# Patient Record
Sex: MALE | Race: WHITE | Employment: STUDENT | URBAN - NONMETROPOLITAN AREA
[De-identification: names, ages, dates, MRNs, and addresses within clinical notes are randomized per-mention and may not be internally consistent; named-entity substitution may affect disease eponyms.]

---
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
## 2021-07-30 ENCOUNTER — TELEPHONE (OUTPATIENT)
Dept: CARDIOLOGY CLINIC | Age: 21
End: 2021-07-30

## 2021-09-16 ENCOUNTER — HOSPITAL ENCOUNTER (EMERGENCY)
Age: 21
Discharge: LEFT BEFORE DISCHARGE | End: 2021-09-17
Payer: COMMERCIAL

## 2021-09-16 VITALS
TEMPERATURE: 98 F | BODY MASS INDEX: 28.12 KG/M2 | WEIGHT: 175 LBS | RESPIRATION RATE: 16 BRPM | HEIGHT: 66 IN | OXYGEN SATURATION: 96 % | HEART RATE: 104 BPM

## 2025-05-05 ENCOUNTER — OFFICE VISIT (OUTPATIENT)
Dept: OTOLARYNGOLOGY | Facility: CLINIC | Age: 25
End: 2025-05-05
Payer: COMMERCIAL

## 2025-05-05 VITALS
SYSTOLIC BLOOD PRESSURE: 135 MMHG | TEMPERATURE: 98 F | BODY MASS INDEX: 34.55 KG/M2 | HEART RATE: 65 BPM | DIASTOLIC BLOOD PRESSURE: 85 MMHG | HEIGHT: 66 IN | WEIGHT: 215 LBS

## 2025-05-05 DIAGNOSIS — J34.2 NASAL SEPTAL DEVIATION: ICD-10-CM

## 2025-05-05 DIAGNOSIS — J34.3 NASAL TURBINATE HYPERTROPHY: Primary | ICD-10-CM

## 2025-05-05 RX ORDER — TRAZODONE HYDROCHLORIDE 50 MG/1
50 TABLET ORAL NIGHTLY
COMMUNITY

## 2025-05-05 RX ORDER — FLUTICASONE PROPIONATE 50 MCG
SPRAY, SUSPENSION (ML) NASAL
COMMUNITY

## 2025-05-05 RX ORDER — CHLORHEXIDINE GLUCONATE ORAL RINSE 1.2 MG/ML
SOLUTION DENTAL
COMMUNITY

## 2025-05-05 RX ORDER — ARIPIPRAZOLE 5 MG/1
5 TABLET ORAL DAILY
COMMUNITY

## 2025-05-05 RX ORDER — CARIPRAZINE 3 MG/1
3 CAPSULE, GELATIN COATED ORAL DAILY
COMMUNITY

## 2025-05-05 RX ORDER — PROPRANOLOL HYDROCHLORIDE 40 MG/1
40 TABLET ORAL DAILY
COMMUNITY

## 2025-05-05 RX ORDER — CETIRIZINE HYDROCHLORIDE 10 MG/1
10 TABLET ORAL DAILY
COMMUNITY

## 2025-05-05 RX ORDER — HYDROCHLOROTHIAZIDE 12.5 MG/1
12.5 TABLET ORAL DAILY
COMMUNITY

## 2025-05-05 RX ORDER — BUSPIRONE HYDROCHLORIDE 7.5 MG/1
TABLET ORAL
COMMUNITY

## 2025-05-05 RX ORDER — ALBUTEROL SULFATE 90 UG/1
INHALANT RESPIRATORY (INHALATION)
COMMUNITY

## 2025-05-05 RX ORDER — HYDROXYZINE HYDROCHLORIDE 10 MG/1
TABLET, FILM COATED ORAL
COMMUNITY

## 2025-05-05 RX ORDER — FLUOXETINE 10 MG/1
10 CAPSULE ORAL DAILY
COMMUNITY

## 2025-05-05 NOTE — PROGRESS NOTES
DERRICK Valencia  [facility]  [address], [address]  [place] [address]  Fax [phone]  Phone [phone]      Visit Type: NEW PATIENT   Chief Complaint   Patient presents with    Sinus Problem     Pt has trouble breathing out of the right side of his nose           HISTORY OBTAINED FROM: patient  HPI  HISTORY OF PRESENT ILLNESS:  Fabio Malik is a 25 y.o. male presents for an initial evaluation of sinus complaints. His main symptoms include nasal congestion, nasal obstruction, and dryness of nose . He has had symptoms that have been present for the last 2 years. The patient denies : nasal drainage and repeated sinusitis. In an average year, the number of antibiotics needed have been : 0. The patient has also tried : intranasal corticosteroidswith : no appreciable improvement.  He has not had prior sinus imaging.    Past Medical History:   Diagnosis Date    Anxiety     Depression     GERD (gastroesophageal reflux disease)     High blood pressure     Sinusitis        Past Surgical History:   Procedure Laterality Date    WISDOM TOOTH EXTRACTION         Family History: His family history includes Heart failure in his father; Hypertension in his father and mother; Thyroid disease in his mother.     Social History: He  reports that he has never smoked. He has never used smokeless tobacco. He reports current alcohol use. He reports that he does not currently use drugs.    Home Medications:  ARIPiprazole, Cariprazine HCl, FLUoxetine, albuterol sulfate HFA, busPIRone, cetirizine, chlorhexidine, fluticasone, hydrOXYzine, hydroCHLOROthiazide, propranolol, and traZODone    Allergies:  He has no known allergies.       Vital Signs:   Temp:  [98 °F (36.7 °C)] 98 °F (36.7 °C)  Heart Rate:  [65] 65  BP: (135)/(85) 135/85  ENT Physical Exam  Constitutional  Appearance: patient appears well-developed,  Communication/Voice: communication appropriate  for developmental age;  Head and Face  Appearance: head appears normal, face appears normal and face appears atraumatic;  Ear  Hearing: intact;  Auricles: right auricle normal; left auricle normal;  External Mastoids: right external mastoid normal; left external mastoid normal;  Ear Canals: right ear canal normal; left ear canal normal;  Tympanic Membranes: right tympanic membrane normal; left tympanic membrane normal;  Nose  External Nose: nares patent bilaterally; external nose normal;  Internal Nose: bilateral intranasal mucosa erythematous (with dryness and crusting); nasal septal deviation present; bilateral inferior turbinates with hypertrophy; bilateral middle turbinates enlarged;  Oral Cavity/Oropharynx  Lips: normal;  Teeth: normal;  Gums: gingiva normal;  Tongue: normal;  Oral mucosa: normal;  Hard palate: normal;  Soft palate: normal;  Tonsils: normal;  Base of Tongue: normal;  Posterior pharyngeal wall: normal;  Neck  Neck: neck normal;  Respiratory  Inspection: breathing unlabored;  Cardiovascular  Inspection: extremities are warm and well perfused;  Neurovestibular  Mental Status: alert and oriented;  Psychiatric: mood normal;           Result Review       RESULTS REVIEW    I have reviewed the patients old records in the chart.         Assessment & Plan  Nasal septal deviation    Nasal turbinate hypertrophy         Saline nasal spray or saline gel to nose three times a day and as needed. Use a bedside humidifier at night. Place Vasoline in nose in the morning and at night.  Place Vasoline in nose in the morning and at night on the center part of the nose (septum) at least 15 minutes prior to the nasal spray. When using the nasal spray, aim towards the outer corner of the eye.   Stop all over the counter decongestant nasal sprays, (Afrin, Vicks, Neosynephrine, etc.). Continued, regular use of these medications can cause rebound congestion and nasal obstruction.  For the best response, use your nasal  sprays every day without skipping doses. It may take several weeks before the full effect is acheived.   Continue flonase and will order CT sinus  Return in about 4 weeks (around 6/2/2025).        Electronically signed by DERRICK Valencia 05/05/25 10:54 AM CDT.

## 2025-06-09 ENCOUNTER — HOSPITAL ENCOUNTER (OUTPATIENT)
Dept: CT IMAGING | Facility: HOSPITAL | Age: 25
Discharge: HOME | End: 2025-06-09
Admitting: NURSE PRACTITIONER
Payer: COMMERCIAL

## 2025-06-09 DIAGNOSIS — J34.2 NASAL SEPTAL DEVIATION: ICD-10-CM

## 2025-06-09 DIAGNOSIS — J34.3 NASAL TURBINATE HYPERTROPHY: ICD-10-CM

## 2025-06-09 PROCEDURE — 70486 CT MAXILLOFACIAL W/O DYE: CPT

## 2025-06-11 ENCOUNTER — RESULTS FOLLOW-UP (OUTPATIENT)
Dept: OTOLARYNGOLOGY | Facility: CLINIC | Age: 25
End: 2025-06-11
Payer: COMMERCIAL

## 2025-06-11 NOTE — TELEPHONE ENCOUNTER
----- Message from Mala Cali sent at 6/9/2025 10:59 AM CDT -----  Has septal spur. Keep follow up  ----- Message -----  From: Luana Rad Results [place]  Sent: 6/9/2025  10:38 AM CDT  To: DERRICK Valencia

## 2025-06-11 NOTE — TELEPHONE ENCOUNTER
Nurse called and left message for patient to call clinic back to discuss CT scan results. Number of clinic left for patient to call back.

## 2025-08-18 ENCOUNTER — OFFICE VISIT (OUTPATIENT)
Dept: OTOLARYNGOLOGY | Facility: CLINIC | Age: 25
End: 2025-08-18
Payer: COMMERCIAL

## 2025-08-18 VITALS
HEIGHT: 66 IN | RESPIRATION RATE: 20 BRPM | HEART RATE: 58 BPM | SYSTOLIC BLOOD PRESSURE: 131 MMHG | WEIGHT: 215 LBS | BODY MASS INDEX: 34.55 KG/M2 | DIASTOLIC BLOOD PRESSURE: 77 MMHG

## 2025-08-18 DIAGNOSIS — J34.2 NASAL SEPTAL DEVIATION: Primary | ICD-10-CM

## 2025-08-18 DIAGNOSIS — J34.3 NASAL TURBINATE HYPERTROPHY: ICD-10-CM

## 2025-08-18 DIAGNOSIS — D48.7 NEOPLASM OF UNCERTAIN BEHAVIOR OF EYE: ICD-10-CM

## 2025-08-18 RX ORDER — OXYMETAZOLINE HYDROCHLORIDE 0.05 G/100ML
2 SPRAY NASAL ONCE
OUTPATIENT
Start: 2025-08-18

## 2025-08-18 RX ORDER — LORAZEPAM 0.5 MG/1
0.5 TABLET ORAL 2 TIMES DAILY PRN
COMMUNITY
Start: 2025-08-14

## 2025-08-18 RX ORDER — FLUOXETINE HYDROCHLORIDE 40 MG/1
1 CAPSULE ORAL DAILY
COMMUNITY
Start: 2025-08-14

## 2025-08-18 RX ORDER — OXYMETAZOLINE HYDROCHLORIDE 0.05 G/100ML
2 SPRAY NASAL
OUTPATIENT
Start: 2025-08-18 | End: 2025-08-19

## 2025-08-18 RX ORDER — ARIPIPRAZOLE 10 MG/1
1 TABLET ORAL DAILY
COMMUNITY
Start: 2025-08-14

## 2025-08-18 RX ORDER — OXYMETAZOLINE HYDROCHLORIDE 0.05 G/100ML
2 SPRAY NASAL
OUTPATIENT
Start: 2025-08-18